# Patient Record
Sex: FEMALE | Race: WHITE | HISPANIC OR LATINO | Employment: OTHER | URBAN - METROPOLITAN AREA
[De-identification: names, ages, dates, MRNs, and addresses within clinical notes are randomized per-mention and may not be internally consistent; named-entity substitution may affect disease eponyms.]

---

## 2024-08-04 ENCOUNTER — OFFICE VISIT (OUTPATIENT)
Dept: URGENT CARE | Facility: PHYSICIAN GROUP | Age: 74
End: 2024-08-04

## 2024-08-04 ENCOUNTER — APPOINTMENT (OUTPATIENT)
Dept: RADIOLOGY | Facility: IMAGING CENTER | Age: 74
End: 2024-08-04
Attending: PHYSICIAN ASSISTANT

## 2024-08-04 VITALS
DIASTOLIC BLOOD PRESSURE: 60 MMHG | HEART RATE: 74 BPM | RESPIRATION RATE: 20 BRPM | TEMPERATURE: 96.7 F | WEIGHT: 182 LBS | SYSTOLIC BLOOD PRESSURE: 124 MMHG | OXYGEN SATURATION: 98 %

## 2024-08-04 DIAGNOSIS — S52.612A CLOSED DISPLACED FRACTURE OF STYLOID PROCESS OF LEFT ULNA, INITIAL ENCOUNTER: ICD-10-CM

## 2024-08-04 DIAGNOSIS — S52.502A CLOSED FRACTURE OF DISTAL END OF LEFT RADIUS, UNSPECIFIED FRACTURE MORPHOLOGY, INITIAL ENCOUNTER: Primary | ICD-10-CM

## 2024-08-04 DIAGNOSIS — M25.532 PAIN AND SWELLING OF LEFT WRIST: ICD-10-CM

## 2024-08-04 DIAGNOSIS — M25.432 PAIN AND SWELLING OF LEFT WRIST: ICD-10-CM

## 2024-08-04 PROCEDURE — 99203 OFFICE O/P NEW LOW 30 MIN: CPT | Performed by: PHYSICIAN ASSISTANT

## 2024-08-04 PROCEDURE — 73110 X-RAY EXAM OF WRIST: CPT | Mod: TC,LT | Performed by: RADIOLOGY

## 2024-08-04 PROCEDURE — 3074F SYST BP LT 130 MM HG: CPT | Performed by: PHYSICIAN ASSISTANT

## 2024-08-04 PROCEDURE — 3078F DIAST BP <80 MM HG: CPT | Performed by: PHYSICIAN ASSISTANT

## 2024-08-04 NOTE — PROGRESS NOTES
Subjective:   Sun King is a 73 y.o. female who presents for Fall (wrist injury,left wrist,painful,swollen,x1 day)     used for today's visit  HPI  The patient presents to the Urgent Care accompanied by her daughter with complaints of left wrist pain onset yesterday.  Patient was bending over putting her shoes on and sitting in a chair when she slipped causing her to fall backwards and her wrist striking the arm portion of a wood chair.  She did not injure anything else.  Denies any pain anywhere else.  Pain immediately after the injury.  Gradually increased swelling and limited range of motion of her left wrist.  Some bruising.  Difficulty moving her wrist and thumb secondary to pain.  She has been applying ointments.  She has not iced it.  She denies any numbness or tingling.      History reviewed. No pertinent past medical history.  No Known Allergies     Objective:     /60 (BP Location: Right arm, Patient Position: Sitting, BP Cuff Size: Adult)   Pulse 74   Temp 35.9 °C (96.7 °F) (Temporal)   Resp 20   Wt 82.6 kg (182 lb)   SpO2 98%     Physical Exam  Vitals reviewed.   Constitutional:       General: She is not in acute distress.     Appearance: Normal appearance. She is not ill-appearing or toxic-appearing.   Eyes:      Conjunctiva/sclera: Conjunctivae normal.   Cardiovascular:      Rate and Rhythm: Normal rate.   Pulmonary:      Effort: Pulmonary effort is normal.   Musculoskeletal:      Right wrist: Swelling (with ecchymosis), tenderness and bony tenderness present. No snuff box tenderness or crepitus. Decreased range of motion. Normal pulse.      Cervical back: Neck supple.   Skin:     General: Skin is warm and dry.   Neurological:      General: No focal deficit present.      Mental Status: She is alert and oriented to person, place, and time.   Psychiatric:         Mood and Affect: Mood normal.         Behavior: Behavior normal.         RADIOLOGY RESULTS    DX-WRIST-COMPLETE 3+ LEFT    Result Date: 8/4/2024 8/4/2024 1:09 PM HISTORY/REASON FOR EXAM:  Left wrist pain after injury during fall TECHNIQUE/EXAM DESCRIPTION AND NUMBER OF VIEWS: 4 views of the LEFT wrist. COMPARISON:  No comparison available FINDINGS:  Bone mineralization is osteopenic.  Impacted acute fracture metaphysis of distal radius is identified.  Displaced ulnar styloid fracture is also present. There is soft tissue swelling.     1.  Impacted acute fracture of the distal radius.   2.  Displaced ulnar styloid fracture.         Diagnosis and associated orders:     1. Closed fracture of distal end of left radius, unspecified fracture morphology, initial encounter  - DX-WRIST-COMPLETE 3+ LEFT; Future  - Referral to Orthopedics    2. Closed displaced fracture of styloid process of left ulna, initial encounter  - Referral to Orthopedics       Comments/MDM:     Provided ice pack for swelling and pain.   X-ray results per radiologist interpretation above. I personally reviewed images and radiologist report  Pain level is tolerable at rest and with ice packs.   Placed in short arm splint. Keep splint on at all times.   Rest, elevation, ice application, Tylenol.   Follow up with Orthopedics.   Patient does have rings on ring finger and middle finger. Unable to remove due to swelling, however, they are not constricting at this time and no pain. She has full distal cap refill. Patient states swelling has improved since yesterday. She understands to return or present to the ER if any finger pain, worsening swelling, paresthesias of fingers, or finger discoloration.       I personally reviewed prior external notes and test results pertinent to today's visit. Pathogenesis of diagnosis discussed including typical length and natural progression. Supportive care, natural history, differential diagnoses, and indications for immediate follow-up discussed. Patient expresses understanding and agrees to plan. Patient  denies any other questions or concerns.     Follow-up with the primary care physician for recheck, reevaluation, and consideration of further management.    Please note that this dictation was created using voice recognition software. I have made a reasonable attempt to correct obvious errors, but I expect that there are errors of grammar and possibly content that I did not discover before finalizing the note.    This note was electronically signed by Eloy Koehler PA-C